# Patient Record
Sex: MALE | Race: WHITE | NOT HISPANIC OR LATINO
[De-identification: names, ages, dates, MRNs, and addresses within clinical notes are randomized per-mention and may not be internally consistent; named-entity substitution may affect disease eponyms.]

---

## 2022-01-13 ENCOUNTER — NON-APPOINTMENT (OUTPATIENT)
Age: 82
End: 2022-01-13

## 2022-01-18 ENCOUNTER — APPOINTMENT (OUTPATIENT)
Dept: NEPHROLOGY | Facility: CLINIC | Age: 82
End: 2022-01-18
Payer: COMMERCIAL

## 2022-01-18 DIAGNOSIS — N13.30 UNSPECIFIED HYDRONEPHROSIS: ICD-10-CM

## 2022-01-18 DIAGNOSIS — I10 ESSENTIAL (PRIMARY) HYPERTENSION: ICD-10-CM

## 2022-01-18 DIAGNOSIS — Z87.891 PERSONAL HISTORY OF NICOTINE DEPENDENCE: ICD-10-CM

## 2022-01-18 DIAGNOSIS — N13.9 OBSTRUCTIVE AND REFLUX UROPATHY, UNSPECIFIED: ICD-10-CM

## 2022-01-18 PROCEDURE — 99245 OFF/OP CONSLTJ NEW/EST HI 55: CPT | Mod: GT

## 2022-01-18 NOTE — CONSULT LETTER
[Dear  ___] : Dear  [unfilled], [Consult Letter:] : I had the pleasure of evaluating your patient, [unfilled]. [( Thank you for referring [unfilled] for consultation for _____ )] : Thank you for referring [unfilled] for consultation for [unfilled] [Please see my note below.] : Please see my note below. [Consult Closing:] : Thank you very much for allowing me to participate in the care of this patient.  If you have any questions, please do not hesitate to contact me. [Sincerely,] : Sincerely, [FreeTextEntry3] : Anitra Rdz MD

## 2022-01-18 NOTE — ASSESSMENT
[FreeTextEntry1] : BENJI, stage 4 CKD: pt. with CKD in the setting of RCC, use of chemotherapy for over 15 years (agent not known to the patient), and obstructive uropathy with fibrotic sequelae from BENJI episodes in the past, now with BENJI in the setting of worsening bilateral hydronephrosis for which unable to have any intervention done due to persistent thrombocytopenia. He was on HD between Jan-April 2021 for BENJI, after which recovered and had a baseline Scr ~2.0. Pt. also with hx of TLS. \par Recent Scr increased to 4.5. \par Pt. with some symptoms of uremia, however denies any SOB/ LE edema/ tremors/ lack of appetite.\par Check labs (will email script to grand-daughter Lorrie who is an RN at [facility])\par Various modalities of dialysis, including in center HD, HHD and PD discussed with patient and his daughter Katia, who will further discuss options with her siblings. \par Will also discuss cancer prognosis with the oncologist. 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

## 2022-01-18 NOTE — HISTORY OF PRESENT ILLNESS
[FreeTextEntry1] : Referral from [facility] for elevated Scr and possible need to initiate HD. He is accompanied by his daughter Katia during the visit, who provided supplemental hx and served as [place]ian  as per his wishes. \par \par Mr. 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 \par \par Pt. states that he was first made aware of abnormal kidney function in Jan 2021, when noted to have renal mass that was biopsied and was consistent with RCC. Around the same time, he also underwent BM biopsy that showed AML. He reports that he was on HD between Jan-April 2021 via tunneled dialysis catheter, after which he recovered and had stable renal function. Recently, his bilateral hydronephrosis has been worsening however no urological intervention is planned, given thrombocytopenia, and the need for HD due to worsened renal function was discussed with his by his nephrologist Dr. Go.\par \par Pt. states that he feels ok, however has good days and bad days when he can become extremely lethargic. His appetite is good however does report "gross" taste that is sometimes metal like in his mouth. Denies any SOB or LE swelling. Denies n, v, d, tremors. Denies any urinary complaints at present, however gives hx of gross hematuria and UTI In the past. \par \par Current meds\par Metoprolol succinate 100mg po daily\par rosuvastatin 10mg po daily\par acyclovir 400 BID\par sod bicarbonate 325mg po TID\par amlodipine 10mg po daily\par pantoprazole 40mg po daily\par Hydroxyurea 500mg, PRN for rise in WCC\par Senna\par Colace\par \par

## 2022-02-06 DIAGNOSIS — N39.0 URINARY TRACT INFECTION, SITE NOT SPECIFIED: ICD-10-CM

## 2022-03-31 ENCOUNTER — APPOINTMENT (OUTPATIENT)
Dept: NEPHROLOGY | Facility: CLINIC | Age: 82
End: 2022-03-31
Payer: MEDICARE

## 2022-03-31 VITALS
SYSTOLIC BLOOD PRESSURE: 158 MMHG | OXYGEN SATURATION: 96 % | HEART RATE: 74 BPM | BODY MASS INDEX: 50.62 KG/M2 | WEIGHT: 315 LBS | DIASTOLIC BLOOD PRESSURE: 74 MMHG | HEIGHT: 66 IN | TEMPERATURE: 97.3 F

## 2022-03-31 DIAGNOSIS — N18.4 CHRONIC KIDNEY DISEASE, STAGE 4 (SEVERE): ICD-10-CM

## 2022-03-31 DIAGNOSIS — N17.9 ACUTE KIDNEY FAILURE, UNSPECIFIED: ICD-10-CM

## 2022-03-31 PROCEDURE — 99215 OFFICE O/P EST HI 40 MIN: CPT

## 2022-03-31 NOTE — CONSULT LETTER
[( Thank you for referring [unfilled] for consultation for _____ )] : Thank you for referring [unfilled] for consultation for [unfilled] [Please see my note below.] : Please see my note below. [Consult Closing:] : Thank you very much for allowing me to participate in the care of this patient.  If you have any questions, please do not hesitate to contact me. [Sincerely,] : Sincerely, [Dear  ___] : Dear  [unfilled], [Courtesy Letter:] : I had the pleasure of seeing your patient, [unfilled], in my office today. [FreeTextEntry3] : Anitra Rdz MD

## 2022-03-31 NOTE — HISTORY OF PRESENT ILLNESS
[FreeTextEntry1] : Pt. here for follow up, accompanied by his daughter Katia. \par \par Mr. 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 \par \par Pt. had initial visit with me in Jan 2022 when he was referred to start dialysis. At that time, his Scr was 4.5. He however did not have any absolute indication to initiate HD at that time. As per daughter, subsequently he was admitted to the [facility] and had further worsening of kidney function during the hospitalization. He had urteral stents exchanged, following which his kidney function improved. Daughter states that they [place] [facility] [place] and were told to follow up with me due to "worsening creatinine"\par \par Pt. states that he feels ok, however has good days and bad days when he can become extremely lethargic. His appetite is good however he no longer relishes eating meat. Denies any SOB or LE swelling. Denies n, v, d, tremors. Denies any urinary complaints at present, however gives hx of gross hematuria and UTI In the past. Daughter states that the patient was complaining of burning micturition last week for which he followed up with his Urologist, who started him on Pyridium.\par \par Current meds\par magnesium chelate 133 po daily\par Metoprolol succinate 100mg po daily\par rosuvastatin 10mg po daily\par acyclovir 400 BID\par sod bicarbonate 325mg po TID\par amlodipine 10mg po daily\par pantoprazole 40mg po daily\par Hydroxyurea 500mg, PRN for rise in WCC\par Senna\par Colace\par pyridium 100mg daily\par \par

## 2022-03-31 NOTE — ASSESSMENT
[FreeTextEntry1] : BENJI, stage 4 CKD: pt. with CKD in the setting of RCC, use of chemotherapy for over 15 years (agent not known to the patient), and obstructive uropathy with fibrotic sequelae from BENJI episodes in the past, with recent BENJI in the setting of worsening hydronephrosis for which he underwent bilateral stent exchange in Jan 2022. Pt. also with hx of TLS. \par He received HD between Jan-April 2021 for BENJI, after which recovered and had a baseline Scr ~2.0. \par Scr elevated but decreased to 3.3 on labs done earlier today at [facility]. \par Pt. with some symptoms of uremia including weakness, however denies any SOB/ LE edema/ tremors/ lack of appetite.\par Check UA, spot urine TP/Cr\par Check HBSAg, HCV Ab and SIFE\par Results of renal US done in Jan reviewed (prior to ureteral stent change), that showed hydronephrosis.\par No plan for RRT at this time. \par Will discuss cancer prognosis with the oncologist Dr. Gold. \par Encouraged to maintain adequate hydration. 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 \par Reviewed results and discussed interpretation of all available medical records with the daughter.\par Xi Andrew would send me report of renal US and today's labs from [facility]

## 2022-04-01 LAB
APPEARANCE: ABNORMAL
BACTERIA: ABNORMAL
BILIRUBIN URINE: NEGATIVE
BLOOD URINE: ABNORMAL
COLOR: YELLOW
CREAT SPEC-SCNC: 65 MG/DL
CREAT/PROT UR: 3.6 RATIO
GLUCOSE QUALITATIVE U: NEGATIVE
HYALINE CASTS: 16 /LPF
KETONES URINE: NEGATIVE
LEUKOCYTE ESTERASE URINE: ABNORMAL
MICROSCOPIC-UA: NORMAL
NITRITE URINE: POSITIVE
PH URINE: 6.5
PROT UR-MCNC: 234 MG/DL
PROTEIN URINE: ABNORMAL
RED BLOOD CELLS URINE: 156 /HPF
SPECIFIC GRAVITY URINE: 1.01
SQUAMOUS EPITHELIAL CELLS: 0 /HPF
UROBILINOGEN URINE: NORMAL
WHITE BLOOD CELLS URINE: >720 /HPF

## 2022-04-04 ENCOUNTER — NON-APPOINTMENT (OUTPATIENT)
Age: 82
End: 2022-04-04

## 2022-04-04 PROBLEM — N39.0 UTI (URINARY TRACT INFECTION): Status: RESOLVED | Noted: 2022-04-04 | Resolved: 2022-05-04

## 2022-04-04 RX ORDER — CIPROFLOXACIN HYDROCHLORIDE 500 MG/1
500 TABLET, FILM COATED ORAL DAILY
Qty: 5 | Refills: 0 | Status: ACTIVE | COMMUNITY
Start: 2022-04-04 | End: 1900-01-01

## 2022-05-13 ENCOUNTER — NON-APPOINTMENT (OUTPATIENT)
Age: 82
End: 2022-05-13

## 2022-05-13 ENCOUNTER — APPOINTMENT (OUTPATIENT)
Dept: NEPHROLOGY | Facility: CLINIC | Age: 82
End: 2022-05-13